# Patient Record
Sex: FEMALE | URBAN - NONMETROPOLITAN AREA
[De-identification: names, ages, dates, MRNs, and addresses within clinical notes are randomized per-mention and may not be internally consistent; named-entity substitution may affect disease eponyms.]

---

## 2023-03-30 ENCOUNTER — OPTICAL OFFICE (OUTPATIENT)
Dept: URBAN - NONMETROPOLITAN AREA CLINIC 5 | Facility: CLINIC | Age: 63
Setting detail: OPHTHALMOLOGY
End: 2023-03-30
Payer: COMMERCIAL

## 2023-03-30 ENCOUNTER — DOCTOR'S OFFICE (OUTPATIENT)
Dept: URBAN - NONMETROPOLITAN AREA CLINIC 2 | Facility: CLINIC | Age: 63
Setting detail: OPHTHALMOLOGY
End: 2023-03-30
Payer: COMMERCIAL

## 2023-03-30 ENCOUNTER — RX ONLY (RX ONLY)
Age: 63
End: 2023-03-30

## 2023-03-30 DIAGNOSIS — H52.03: ICD-10-CM

## 2023-03-30 PROBLEM — H25.013 CORTICAL CATARACT; BOTH EYES: Status: ACTIVE | Noted: 2023-03-30

## 2023-03-30 PROBLEM — H43.393 VITREOUS FLOATERS; BOTH EYES: Status: ACTIVE | Noted: 2023-03-30

## 2023-03-30 PROBLEM — H04.123 DRY EYE; BOTH EYES: Status: ACTIVE | Noted: 2023-03-30

## 2023-03-30 PROCEDURE — 92004 COMPRE OPH EXAM NEW PT 1/>: CPT | Performed by: OPTOMETRIST

## 2023-03-30 PROCEDURE — V2200 LENS SPHER BIFOC PLANO 4.00D: HCPCS | Performed by: OPTOMETRIST

## 2023-03-30 PROCEDURE — V2203 LENS SPHCYL BIFOCAL 4.00D/.1: HCPCS | Performed by: OPTOMETRIST

## 2023-03-30 PROCEDURE — V2750 ANTI-REFLECTIVE COATING: HCPCS | Performed by: OPTOMETRIST

## 2023-03-30 PROCEDURE — V2781 PROGRESSIVE LENS PER LENS: HCPCS | Performed by: OPTOMETRIST

## 2023-03-30 PROCEDURE — V2020 VISION SVCS FRAMES PURCHASES: HCPCS | Performed by: OPTOMETRIST

## 2023-03-30 PROCEDURE — V2025 EYEGLASSES DELUX FRAMES: HCPCS | Performed by: OPTOMETRIST

## 2023-03-30 ASSESSMENT — REFRACTION_MANIFEST
OS_SPHERE: +1.25
OS_VA2: 20/20-2
OS_VA1: 20/20-2
OD_SPHERE: +1.50
OS_ADD: +2.50
OD_AXIS: 090
OD_VA2: 20/25+2
OD_CYLINDER: -0.50
OD_ADD: +2.50
OS_CYLINDER: SPH
OD_VA1: 20/25+2

## 2023-03-30 ASSESSMENT — SPHEQUIV_DERIVED
OD_SPHEQUIV: 1.5
OD_SPHEQUIV: 1.25
OS_SPHEQUIV: 1.25

## 2023-03-30 ASSESSMENT — SUPERFICIAL PUNCTATE KERATITIS (SPK)
OS_SPK: T 1+
OD_SPK: T 1+

## 2023-03-30 ASSESSMENT — REFRACTION_AUTOREFRACTION
OS_AXIS: 000
OS_CYLINDER: 0.00
OD_SPHERE: +2.25
OS_SPHERE: +1.25
OD_CYLINDER: -1.50
OD_AXIS: 077

## 2023-03-30 ASSESSMENT — REFRACTION_CURRENTRX
OS_OVR_VA: 20/
OD_OVR_VA: 20/

## 2023-03-30 ASSESSMENT — TEAR BREAK UP TIME (TBUT)
OD_TBUT: 6-7 SEC
OS_TBUT: 6-7 SEC

## 2023-03-30 ASSESSMENT — VISUAL ACUITY
OD_BCVA: 20/30
OS_BCVA: 20/30-2

## 2023-03-30 ASSESSMENT — TONOMETRY
OD_IOP_MMHG: 13
OS_IOP_MMHG: 13

## 2023-03-30 ASSESSMENT — CONFRONTATIONAL VISUAL FIELD TEST (CVF)
OS_FINDINGS: FULL
OD_FINDINGS: FULL

## 2024-05-12 ENCOUNTER — OFFICE VISIT (OUTPATIENT)
Dept: URGENT CARE | Facility: CLINIC | Age: 64
End: 2024-05-12
Payer: COMMERCIAL

## 2024-05-12 VITALS
SYSTOLIC BLOOD PRESSURE: 110 MMHG | DIASTOLIC BLOOD PRESSURE: 80 MMHG | TEMPERATURE: 97.8 F | RESPIRATION RATE: 17 BRPM | WEIGHT: 210 LBS | OXYGEN SATURATION: 97 % | HEIGHT: 69 IN | HEART RATE: 90 BPM | BODY MASS INDEX: 31.1 KG/M2

## 2024-05-12 DIAGNOSIS — B02.9 HERPES ZOSTER WITHOUT COMPLICATION: Primary | ICD-10-CM

## 2024-05-12 PROCEDURE — G0382 LEV 3 HOSP TYPE B ED VISIT: HCPCS

## 2024-05-12 PROCEDURE — 99283 EMERGENCY DEPT VISIT LOW MDM: CPT

## 2024-05-12 RX ORDER — TRAMADOL HYDROCHLORIDE 50 MG/1
50 TABLET ORAL EVERY 6 HOURS PRN
COMMUNITY

## 2024-05-12 RX ORDER — VALACYCLOVIR HYDROCHLORIDE 1 G/1
1000 TABLET, FILM COATED ORAL 3 TIMES DAILY
Qty: 30 TABLET | Refills: 0 | Status: SHIPPED | OUTPATIENT
Start: 2024-05-12 | End: 2024-05-22

## 2024-05-12 RX ORDER — LIDOCAINE HYDROCHLORIDE 20 MG/ML
JELLY TOPICAL 3 TIMES DAILY
Qty: 30 ML | Refills: 0 | Status: SHIPPED | OUTPATIENT
Start: 2024-05-12 | End: 2024-05-22

## 2024-05-12 NOTE — PROGRESS NOTES
[facility]        NAME: Sofiya Israel is a 63 y.o. female  : 1960    MRN: [id number]  DATE: May 12, 2024  TIME: 4:00 PM    Assessment and Plan   Herpes zoster without complication [B02.9]  1. Herpes zoster without complication  valACYclovir (VALTREX) 1,000 mg tablet    lidocaine (XYLOCAINE) 2 % topical gel            Patient Instructions       Follow up with PCP in 3-5 days.  Proceed to  ER if symptoms worsen.    If tests have been performed at [facility], our office will contact you with results if changes need to be made to the care plan discussed with you at the visit.  You can review your full results on [facility].    Chief Complaint     Chief Complaint   Patient presents with    Rash     C/o blistered rash over left breast, beneath breast and lateral left rib area.Onset yesterday, reports it is painful.         History of Present Illness       Patient notes 2-day history of increasing right rash left flank left breast and back.  She has been using Tylenol and ibuprofen for pain.  She denies history of shingles or exposure to poison ivy.  States that rash is very painful and has been getting worse.  It does not cross the midline.  It is dermatomal terminal in positioning.  Teaching done regarding shingles rash, shingles vaccination, care for rash and medications.  Will order valacyclovir and lidocaine gel for pain.  Also discussed use of Benadryl for itching Tylenol and ibuprofen use for pain.        Review of Systems   Review of Systems   Constitutional: Negative.  Negative for chills, fatigue and fever.   HENT: Negative.  Negative for congestion.    Eyes: Negative.  Negative for discharge.   Respiratory: Negative.  Negative for cough and wheezing.    Cardiovascular: Negative.  Negative for chest pain.   Gastrointestinal: Negative.  Negative for abdominal pain.   Endocrine: Negative.    Genitourinary: Negative.  Negative for dysuria.   Musculoskeletal: Negative.  Negative for gait  "problem.   Skin:  Positive for rash. Negative for color change.   Allergic/Immunologic: Negative for environmental allergies.   Neurological: Negative.  Negative for weakness and headaches.   Psychiatric/Behavioral: Negative.  Negative for confusion.          Current Medications       Current Outpatient Medications:     lidocaine (XYLOCAINE) 2 % topical gel, Apply topically 3 (three) times a day for 10 days, Disp: 30 mL, Rfl: 0    semaglutide, 0.25 or 0.5 mg/dose, (Ozempic) 2 mg/3 mL injection pen, Inject 0.5 mg under the skin every 7 days, Disp: , Rfl:     traMADol (ULTRAM) 50 mg tablet, Take 50 mg by mouth every 6 (six) hours as needed for moderate pain, Disp: , Rfl:     valACYclovir (VALTREX) 1,000 mg tablet, Take 1 tablet (1,000 mg total) by mouth 3 (three) times a day for 10 days, Disp: 30 tablet, Rfl: 0    Current Allergies     Allergies as of 05/12/2024    (No Known Allergies)            The following portions of the patient's history were reviewed and updated as appropriate: allergies, current medications, past family history, past medical history, past social history, past surgical history and problem list.     Past Medical History:   Diagnosis Date    Known health problems: none        Past Surgical History:   Procedure Laterality Date    NO PAST SURGERIES         No family history on file.      Medications have been verified.        Objective   /80   Pulse 90   Temp 97.8 °F (36.6 °C)   Resp 17   Ht 5' 9\" (1.753 m)   Wt 95.3 kg (210 lb)   SpO2 97%   BMI 31.01 kg/m²   No LMP recorded. Patient is postmenopausal.       Physical Exam     Physical Exam  Vitals and nursing note reviewed.   Constitutional:       General: She is not in acute distress.     Appearance: Normal appearance. She is well-developed and normal weight. She is not ill-appearing or toxic-appearing.   HENT:      Head: Normocephalic and atraumatic.      Right Ear: External ear normal.      Left Ear: External ear normal.      Nose: " Nose normal.      Mouth/Throat:      Mouth: Mucous membranes are moist.   Eyes:      General:         Right eye: No discharge.         Left eye: No discharge.      Conjunctiva/sclera: Conjunctivae normal.      Pupils: Pupils are equal, round, and reactive to light.   Neck:      Thyroid: No thyromegaly.   Pulmonary:      Effort: Pulmonary effort is normal.   Abdominal:      General: Abdomen is flat. There is no distension.   Musculoskeletal:         General: Normal range of motion.      Cervical back: Normal range of motion.   Skin:     General: Skin is warm.      Coloration: Skin is not pale.      Findings: Rash present. Rash is vesicular.             Comments: Left dermatomal rash vesicular with some open with yellowish discharge.  Following the dermatome along lower left breast around to left mid back.  Tender and surrounding skin within normal limits.   Neurological:      General: No focal deficit present.      Mental Status: She is alert and oriented to person, place, and time.   Psychiatric:         Mood and Affect: Mood normal.         Behavior: Behavior normal.         Thought Content: Thought content normal.         Judgment: Judgment normal.

## 2025-08-04 ENCOUNTER — HOSPITAL ENCOUNTER (OUTPATIENT)
Dept: RADIOLOGY | Facility: CLINIC | Age: 65
Discharge: HOME | End: 2025-08-04
Attending: STUDENT IN AN ORGANIZED HEALTH CARE EDUCATION/TRAINING PROGRAM
Payer: MEDICARE

## 2025-08-04 ENCOUNTER — OFFICE VISIT (OUTPATIENT)
Dept: PODIATRY | Age: 65
End: 2025-08-04
Payer: MEDICARE

## 2025-08-04 VITALS — BODY MASS INDEX: 35.37 KG/M2 | HEIGHT: 69 IN | WEIGHT: 238.8 LBS

## 2025-08-04 DIAGNOSIS — M79.671 RIGHT FOOT PAIN: Primary | ICD-10-CM

## 2025-08-04 DIAGNOSIS — L84 CALLUS: ICD-10-CM

## 2025-08-04 DIAGNOSIS — G62.9 PERIPHERAL POLYNEUROPATHY: ICD-10-CM

## 2025-08-04 DIAGNOSIS — M54.50 LOW BACK PAIN, UNSPECIFIED BACK PAIN LATERALITY, UNSPECIFIED CHRONICITY, UNSPECIFIED WHETHER SCIATICA PRESENT: ICD-10-CM

## 2025-08-04 DIAGNOSIS — M79.671 RIGHT FOOT PAIN: ICD-10-CM

## 2025-08-04 PROCEDURE — 73630 X-RAY EXAM OF FOOT: CPT

## 2025-08-04 PROCEDURE — 99203 OFFICE O/P NEW LOW 30 MIN: CPT | Performed by: STUDENT IN AN ORGANIZED HEALTH CARE EDUCATION/TRAINING PROGRAM

## 2025-08-04 RX ORDER — UREA 40 %
CREAM (GRAM) TOPICAL DAILY
Qty: 227 G | Refills: 3 | Status: SHIPPED | OUTPATIENT
Start: 2025-08-04 | End: 2025-08-08 | Stop reason: ALTCHOICE

## 2025-08-08 DIAGNOSIS — L84 CALLUS: Primary | ICD-10-CM

## 2025-08-08 RX ORDER — AMMONIUM LACTATE 12 G/100G
CREAM TOPICAL 2 TIMES DAILY
Qty: 385 G | Refills: 3 | Status: SHIPPED | OUTPATIENT
Start: 2025-08-08